# Patient Record
Sex: FEMALE | Race: WHITE | Employment: FULL TIME | URBAN - METROPOLITAN AREA
[De-identification: names, ages, dates, MRNs, and addresses within clinical notes are randomized per-mention and may not be internally consistent; named-entity substitution may affect disease eponyms.]

---

## 2022-08-30 ENCOUNTER — HOSPITAL ENCOUNTER (OUTPATIENT)
Age: 42
Setting detail: OBSERVATION
Discharge: HOME | End: 2022-08-31
Attending: PSYCHIATRY & NEUROLOGY | Admitting: PSYCHIATRY & NEUROLOGY

## 2022-08-30 PROBLEM — F32.2 MAJOR DEPRESSIVE DISORDER, SEVERE (HCC): Status: ACTIVE | Noted: 2022-08-30

## 2022-08-30 PROBLEM — F33.9 MDD (MAJOR DEPRESSIVE DISORDER), RECURRENT EPISODE (HCC): Status: ACTIVE | Noted: 2022-08-30

## 2022-08-30 PROCEDURE — 1240000000 HC EMOTIONAL WELLNESS R&B

## 2022-08-30 PROCEDURE — G0379 DIRECT REFER HOSPITAL OBSERV: HCPCS

## 2022-08-30 PROCEDURE — G0378 HOSPITAL OBSERVATION PER HR: HCPCS

## 2022-08-30 RX ORDER — DULOXETIN HYDROCHLORIDE 30 MG/1
30 CAPSULE, DELAYED RELEASE ORAL NIGHTLY
Status: ON HOLD | COMMUNITY
End: 2022-08-31 | Stop reason: HOSPADM

## 2022-08-30 RX ORDER — GABAPENTIN 300 MG/1
900 CAPSULE ORAL NIGHTLY
COMMUNITY

## 2022-08-30 RX ORDER — HYDROXYZINE 50 MG/1
50 TABLET, FILM COATED ORAL 3 TIMES DAILY PRN
Status: DISCONTINUED | OUTPATIENT
Start: 2022-08-30 | End: 2022-08-31 | Stop reason: HOSPADM

## 2022-08-30 RX ORDER — IBUPROFEN 400 MG/1
400 TABLET ORAL EVERY 6 HOURS PRN
Status: DISCONTINUED | OUTPATIENT
Start: 2022-08-30 | End: 2022-08-31 | Stop reason: HOSPADM

## 2022-08-30 RX ORDER — ACETAMINOPHEN 325 MG/1
650 TABLET ORAL EVERY 4 HOURS PRN
Status: DISCONTINUED | OUTPATIENT
Start: 2022-08-30 | End: 2022-08-31 | Stop reason: HOSPADM

## 2022-08-30 RX ORDER — TRAZODONE HYDROCHLORIDE 50 MG/1
50 TABLET ORAL NIGHTLY PRN
Status: DISCONTINUED | OUTPATIENT
Start: 2022-08-30 | End: 2022-08-31 | Stop reason: HOSPADM

## 2022-08-30 RX ORDER — OMEPRAZOLE 20 MG/1
40 CAPSULE, DELAYED RELEASE ORAL 2 TIMES DAILY
Status: ON HOLD | COMMUNITY
End: 2022-08-31 | Stop reason: HOSPADM

## 2022-08-30 RX ORDER — MAGNESIUM HYDROXIDE/ALUMINUM HYDROXICE/SIMETHICONE 120; 1200; 1200 MG/30ML; MG/30ML; MG/30ML
30 SUSPENSION ORAL EVERY 6 HOURS PRN
Status: DISCONTINUED | OUTPATIENT
Start: 2022-08-30 | End: 2022-08-31 | Stop reason: HOSPADM

## 2022-08-30 RX ORDER — OLANZAPINE 5 MG/1
5 TABLET ORAL EVERY 6 HOURS PRN
Status: DISCONTINUED | OUTPATIENT
Start: 2022-08-30 | End: 2022-08-31 | Stop reason: HOSPADM

## 2022-08-30 RX ORDER — DIPHENHYDRAMINE HYDROCHLORIDE 50 MG/ML
50 INJECTION INTRAMUSCULAR; INTRAVENOUS EVERY 4 HOURS PRN
Status: DISCONTINUED | OUTPATIENT
Start: 2022-08-30 | End: 2022-08-31 | Stop reason: HOSPADM

## 2022-08-30 RX ORDER — POLYETHYLENE GLYCOL 3350 17 G
2 POWDER IN PACKET (EA) ORAL
Status: DISCONTINUED | OUTPATIENT
Start: 2022-08-30 | End: 2022-08-31 | Stop reason: HOSPADM

## 2022-08-30 ASSESSMENT — SLEEP AND FATIGUE QUESTIONNAIRES
DO YOU USE A SLEEP AID: NO
DO YOU HAVE DIFFICULTY SLEEPING: NO
AVERAGE NUMBER OF SLEEP HOURS: 6

## 2022-08-30 ASSESSMENT — LIFESTYLE VARIABLES
HOW MANY STANDARD DRINKS CONTAINING ALCOHOL DO YOU HAVE ON A TYPICAL DAY: 1 OR 2
HOW OFTEN DO YOU HAVE A DRINK CONTAINING ALCOHOL: MONTHLY OR LESS

## 2022-08-30 NOTE — BH NOTE
Pt admitted to the unit via stretcher, accompanied by ambulance personnel. Oriented to room and VS taken.

## 2022-08-31 VITALS
HEIGHT: 64 IN | SYSTOLIC BLOOD PRESSURE: 94 MMHG | TEMPERATURE: 97.9 F | DIASTOLIC BLOOD PRESSURE: 59 MMHG | RESPIRATION RATE: 16 BRPM | WEIGHT: 148 LBS | BODY MASS INDEX: 25.27 KG/M2 | OXYGEN SATURATION: 99 % | HEART RATE: 97 BPM

## 2022-08-31 PROBLEM — F43.10 PTSD (POST-TRAUMATIC STRESS DISORDER): Status: ACTIVE | Noted: 2022-08-31

## 2022-08-31 PROBLEM — F33.9 MAJOR DEPRESSION, RECURRENT (HCC): Status: ACTIVE | Noted: 2022-08-31

## 2022-08-31 PROBLEM — T50.902A INTENTIONAL DRUG OVERDOSE (HCC): Status: ACTIVE | Noted: 2022-08-31

## 2022-08-31 LAB
CHOLESTEROL, TOTAL: 218 MG/DL (ref 0–199)
HDLC SERPL-MCNC: 55 MG/DL (ref 40–60)
LDL CHOLESTEROL CALCULATED: 129 MG/DL
TRIGL SERPL-MCNC: 171 MG/DL (ref 0–150)
TSH SERPL DL<=0.05 MIU/L-ACNC: 1.27 UIU/ML (ref 0.27–4.2)
VLDLC SERPL CALC-MCNC: 34 MG/DL

## 2022-08-31 PROCEDURE — 6370000000 HC RX 637 (ALT 250 FOR IP): Performed by: PSYCHIATRY & NEUROLOGY

## 2022-08-31 PROCEDURE — 5130000000 HC BRIDGE APPOINTMENT

## 2022-08-31 PROCEDURE — 80061 LIPID PANEL: CPT

## 2022-08-31 PROCEDURE — 84443 ASSAY THYROID STIM HORMONE: CPT

## 2022-08-31 PROCEDURE — G0378 HOSPITAL OBSERVATION PER HR: HCPCS

## 2022-08-31 PROCEDURE — 36415 COLL VENOUS BLD VENIPUNCTURE: CPT

## 2022-08-31 PROCEDURE — 83036 HEMOGLOBIN GLYCOSYLATED A1C: CPT

## 2022-08-31 PROCEDURE — 6370000000 HC RX 637 (ALT 250 FOR IP)

## 2022-08-31 RX ORDER — OMEPRAZOLE 20 MG/1
40 CAPSULE, DELAYED RELEASE ORAL
Status: DISCONTINUED | OUTPATIENT
Start: 2022-08-31 | End: 2022-08-31 | Stop reason: HOSPADM

## 2022-08-31 RX ORDER — DULOXETIN HYDROCHLORIDE 60 MG/1
60 CAPSULE, DELAYED RELEASE ORAL DAILY
Status: DISCONTINUED | OUTPATIENT
Start: 2022-08-31 | End: 2022-08-31 | Stop reason: HOSPADM

## 2022-08-31 RX ORDER — OMEPRAZOLE 40 MG/1
40 CAPSULE, DELAYED RELEASE ORAL
Qty: 60 CAPSULE | Refills: 0 | Status: SHIPPED | OUTPATIENT
Start: 2022-08-31

## 2022-08-31 RX ORDER — DULOXETIN HYDROCHLORIDE 60 MG/1
60 CAPSULE, DELAYED RELEASE ORAL DAILY
Qty: 30 CAPSULE | Refills: 0 | Status: SHIPPED | OUTPATIENT
Start: 2022-08-31

## 2022-08-31 RX ADMIN — DULOXETINE HYDROCHLORIDE 60 MG: 60 CAPSULE, DELAYED RELEASE ORAL at 12:53

## 2022-08-31 RX ADMIN — NICOTINE POLACRILEX 2 MG: 2 LOZENGE ORAL at 11:50

## 2022-08-31 ASSESSMENT — SLEEP AND FATIGUE QUESTIONNAIRES
AVERAGE NUMBER OF SLEEP HOURS: 6
DO YOU USE A SLEEP AID: NO

## 2022-08-31 NOTE — PROGRESS NOTES
Behavioral Services  Medicare Certification Upon Admission    I certify that this patient's inpatient psychiatric hospital admission is medically necessary for:    [x] (1) Treatment which could reasonably be expected to improve this patient's condition,       [x] (2) Or for diagnostic study;     AND     [x](2) The inpatient psychiatric services are provided while the individual is under the care of a physician and are included in the individualized plan of care.     Estimated length of stay/service 2d     Plan for post-hospital care outpt    Electronically signed by Skylar Fisher MD on 8/31/2022 at 12:04 PM

## 2022-08-31 NOTE — DISCHARGE INSTRUCTIONS
Advanced Directives:  Patient does not have a surrogate decision maker appointed   Name (if yes): declined Phone Number:   Patient does not have a psychiatric and/ or medical advanced directive or power of . Patient was offered psychiatric and/ or medical advanced directive or power of  information/completion but declined to complete   Why not? declined    Discharge Planning is Complete. Discharge Date: 8/31/2022  Reason for Hospitalization: Suicidal Ideation  Discharge Diagnosis: Depression  Discharging to: Private Domicile    Your instructions: Your physician here was Meek Gabriel MD. If you have any questions please call the unit at [phone] for the adult unit or [phone] for [facility]. Please note that we have a patient family advisory [place] that meets the second Wednesday of January and the second Wednesday of July at [facility] in [place] at [facility]. Department leadership would love for you to attend to give feedback on what we are doing well and areas in which we can improve our patient care. Please come if you are able and feel free to reach out to [facility] at [phone] with any questions. The National Suicide and Crisis Hotline Number is 65. You can call, chat, or text this number at any time to access emergency mental health services. Please follow up with your PCP regarding any pending labs. [facility] for individual therapy services. They offer this service through telehealth with offices based in [place] and [place]. To connect with this agency call them at [phone]. Agency:  [facility]  Address:  Offices located in [place] and [address] [place] [address]. CALL TO CHECK AVAILABILITY WITH YOUR INSURANCE NETWORK.   [facility]  Counselor in [facility]  Address: [address] [place] [address] [facility] [address]  Phone: [phone] [address]  Address: [address] [place] [address], [address] [place] [address] Drive  Phone: [phone]    Discharge Completed By: Octavio Goodpasture LSW  Fax to: Follow up provider name and number  NO FAX    The following personal items were collected during your admission and were returned to you:    Belongings  Dental Appliances: None  Vision - Corrective Lenses: None  Hearing Aid: None  Clothing: Dress  Jewelry: Body Piercing (Belly button , nipple and nose piercings.)  Body Piercings Removed: No  Electronic Devices: Cell Phone  Weapons (Notify Protective Services/Security): None  Other Valuables: Cigarettes  Home Medications: Locked  Valuables Given To: Security (Safe)  Provide Name(s) of Who Valuable(s) Were Given To: Cecilio Cai    By signing below, I understand and acknowledge receipt of the instructions indicated above.

## 2022-08-31 NOTE — GROUP NOTE
Group Therapy Note    Date: 8/31/2022    Group Start Time: 1100  Group End Time: 1200  Group Topic: Psychoeducation    [facility]        Group Therapy Note    Topic: Mindfulness & Grounding Techniques and Practice    Group facilitator led education discussion of current grounding practices of group members. Facilitator provided education in 3 categories: physical, mental, and soothing grounding techniques, assisted patients in practice of these separate categories of grounding techniques. Attendees: 9       Notes: This pt was present and actively engaged during group process, practicing each of these skills while collaborating with peers in process. Pt verbalized understanding of information presented and skills/techniques practiced. Status After Intervention:  Improved    Participation Level:  Active Listener and Interactive    Participation Quality: Sharing and Supportive      Speech:  normal      Thought Process/Content: Logical      Affective Functioning: Congruent      Mood: euthymic      Level of consciousness:  Alert and Oriented x4      Response to Learning: Progressing to goal      Endings: None Reported    Modes of Intervention: Education, Support, Socialization, Exploration, Problem-solving, and Activity      Discipline Responsible: Psychoeducational Specialist      Signature:  Rosita Davis MM, EDIE

## 2022-08-31 NOTE — PLAN OF CARE
[facility]  Admission Note     Admission Type:   Admission Type: Involuntary    Reason for admission:  Reason for Admission: Suicide attempt OD on Cymbalta and Gabapentin      Addictive Behavior:   Addictive Behavior  In the Past 3 Months, Have You Felt or Has Someone Told You That You Have a Problem With  : None    Medical Problems:   History reviewed. No pertinent past medical history. Status EXAM:  Mental Status and Behavioral Exam  Normal: No  Level of Assistance: Independent/Self  Facial Expression: Sad  Affect: Appropriate  Level of Consciousness: Alert  Frequency of Checks: 4 times per hour, close  Mood:Normal: No  Mood: Sad  Motor Activity:Normal: Yes  Eye Contact: Good  Observed Behavior: Friendly, Cooperative  Sexual Misconduct History: Current - no  Preception: [place] to person, [place] to place, [place] to time, [place] to situation  Attention:Normal: Yes  Thought Processes: Other (comment) (Linear)  Thought Content:Normal: Yes  Depression Symptoms: No problems reported or observed. Anxiety Symptoms: No problems reported or observed. Ronit Symptoms: No problems reported or observed.   Hallucinations: None  Delusions: No  Memory:Normal: Yes  Insight and Judgment: No  Insight and Judgment: Poor insight    Tobacco Screening:  Practical Counseling, on admission, yola X, if applicable and completed (first 3 are required if patient doesn't refuse):            ( ) Recognizing danger situations (included triggers and roadblocks)                    ( ) Coping skills (new ways to manage stress,relaxation techniques, changing routine, distraction)                                                           ( ) Basic information about quitting (benefits of quitting, techniques in how to quit, available resources  ( ) Referral for counseling faxed to Kristyn                                                                                                                   ( )

## 2022-08-31 NOTE — PROGRESS NOTES
[facility]  Discharge Note    Pt discharged with followings belongings:   Dental Appliances: None  Vision - Corrective Lenses: None  Hearing Aid: None  Jewelry: Body Piercing (Belly button , nipple and nose piercings.)  Body Piercings Removed: No  Clothing: Dress  Other Valuables: Cigarettes   Valuables sent home to patient. Patient education on aftercare instructions: yes  Information faxed to NA by NA  at 3:29 PM .Patient verbalize understanding of AVS:  yes. Status EXAM upon discharge:  Mental Status and Behavioral Exam  Normal: No  Level of Assistance: Independent/Self  Facial Expression: Sad  Affect: Appropriate  Level of Consciousness: Alert  Frequency of Checks: 4 times per hour, close  Mood:Normal: No  Mood: Sad  Motor Activity:Normal: Yes  Eye Contact: Good  Observed Behavior: Cooperative, Friendly  Sexual Misconduct History: Past - no  Preception: [place] to person, [place] to time, [place] to place, [place] to situation  Attention:Normal: Yes  Thought Processes:  (linear)  Thought Content:Normal: Yes  Depression Symptoms: No problems reported or observed. Anxiety Symptoms: No problems reported or observed. Ronit Symptoms: No problems reported or observed.   Hallucinations: None  Delusions: No  Memory:Normal: Yes  Insight and Judgment: No  Insight and Judgment: Poor judgment, Poor insight    Tobacco Screening:  Practical Counseling, on admission, yola X, if applicable and completed (first 3 are required if patient doesn't refuse):            (X) Recognizing danger situations (included triggers and roadblocks)                    (X) Coping skills (new ways to manage stress,relaxation techniques, changing routine, distraction)                                                           (X) Basic information about quitting (benefits of quitting, techniques in how to quit, available resources  ( ) Referral for counseling faxed to Kristyn ( ) Patient refused counseling  ( ) Patient refused referral  ( ) Patient refused prescription upon discharge  ( ) Patient has not smoked in the last 30 days    Metabolic Screening:    No results found for: LABA1C    No results found for: CHOL  No results found for: TRIG  No results found for: HDL  No components found for: LDLCAL  No results found for: LABVLDL    Michaell Claude Renee Porta), RN    Patient's significant other arrived on unit to  patient. She is discharging home with him. Left the unit at 1513.
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
## 2022-09-01 LAB
ESTIMATED AVERAGE GLUCOSE: 96.8 MG/DL
HBA1C MFR BLD: 5 %

## 2022-09-02 NOTE — DISCHARGE SUMMARY
Discharge Summary   Admit Date: 8/30/2022   Discharge Date:  8/31/2022    Condition at dc stable  Spent over 40 minutes with patient and staff on [facility] with more than 50 % of time spent with patient discussing care  Final Dx: axis I:   MDD (major depressive disorder), recurrent episode ([address]     Axis 2: No diagnosis  [place] 3: See Medical History    And Present on Admission:   MDD (major depressive disorder), recurrent episode ([address]   Intentional drug overdose ([address]   PTSD (post-traumatic stress disorder)     Axis 4: Problems related to the social environment  Axis 5:  On Admission: 41-50 serious symptoms At Discharge: 61-70 mild symptoms   All conditions on Axis 1 and Axis 2 and active problems on Axis 3 were treated while patient was hospitalized. [facility] Problems    Diagnosis Date Noted    Intentional drug overdose ([address] Keegan Ibarra 08/31/2022     Priority: Medium    PTSD (post-traumatic stress disorder) [F43.10] 08/31/2022     Priority: Medium    MDD (major depressive disorder), recurrent episode ([address] [F33.9] 08/30/2022     Priority: Medium   )   Condition on DC  Mood and affect are stable and pt is not suicidal   VITALS:  BP (!) 94/59   Pulse 97   Temp 97.9 °F (36.6 °C) (Temporal)   Resp 16   Ht 5' 4\" (1.626 m)   Wt 148 lb (67.1 kg)   LMP 08/07/2022 (Approximate)   SpO2 99%   BMI 25.40 kg/m²   Brief Summary Present Illness   CHIEF COMPLAINT:  Overdose. HISTORY OF PRESENT ILLNESS:  The patient is a 69-year-old female who  presented initially to [address] [place] [address],  after she had been struggling with variety of aspects of her life. She  stated that she has had a rough weekend and she is the POA for her  grandmother who lives in [place]. She and her sister are about POA,  but her other sister lives out of the area. The patient moved to  [address] [place] [address] in 49/3394 from hospitals, which is where her  grandmother lives.   She has been in an assisted living situation there. 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 She feels that the job is  very high stress and she has been there for 3 months. She stated that  she also has two teenage stepchildren and she states she took off on  Friday, Monday last week and felt that she was not going to be disturbed  and work continue to call her regarding issues and she felt that there  was a \"breaking point. \"  She felt pulled and was very agitated about  that. She stated that she quickly wrote a note that she cannot be  everything to every one and decided at home to take approximately 30  Cymbalta and 40 gabapentin and did not initially tell her boyfriend, but  10-15 minutes later she did, and she was brought to the hospital for  treatment. Her boyfriend is a mental health provider and she felt like  he was helpful and supportive and trying to get her some help. She stated that her anxiety has been very high and she has been  frequently more depressed. 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 There is some type of a cage that was surgically  placed and she continues to feel agitated throughout the day and feels  that she cannot be still. Today, she states she is not suicidal.  She stated that she was \"stupid\"  for attempting. She denies any prior suicide attempts  Hospital Course  Patient stabilized on unit. restart Cymbalta, but increase from 30-60 mg daily   Patient was discharged to home to continue recovery in the community. Discharge home with outpatient services in the [address] [place] [address]. The patient is currently endorsing no suicidal ideation and stated  that she feels safe returning home and will return to her work and her  home with her boyfriend. Discuss if symptoms return that she should  seek help immediately. Discharge today.   PE: (reviewed) and labs (see medical H&PE)  Labs:    Admission on 08/30/2022, Discharged on 08/31/2022   Component Date Value Ref Range Status    Cholesterol, Total 08/31/2022 218 (A) 0 - 199 mg/dL Final    Triglycerides 08/31/2022 171 (A) 0 - 150 mg/dL Final    HDL 08/31/2022 55  40 - 60 mg/dL Final    LDL Calculated 08/31/2022 129 (A) <100 mg/dL Final    VLDL Cholesterol Calculated 08/31/2022 34  Not Established mg/dL Final    Hemoglobin A1C 08/31/2022 5.0  See comment % Final    Comment: Comment:  Diagnosis of Diabetes: > or = 6.5%  Increased risk of diabetes (Prediabetes): 5.7-6.4%  Glycemic Control: Nonpregnant Adults: <7.0%                    Pregnant: <6.0%        eAG 08/31/2022 96.8  mg/dL Final    TSH 08/31/2022 1.27  0.27 - 4.20 uIU/mL Final        Mental Status Exam at Discharge:  Level of consciousness:  awake  Appearance:  well-appearing, in chair, good grooming and good hygiene well-developed, well-nourished  Behavior/Motor:  no abnormalities noted normal gait and station AIMS: 0  Attitude toward examiner:  cooperative, attentive and good eye contact  Speech:  spontaneous, normal rate, normal volume and well articulated  Mood:  dysthymic  Affect:  mood congruent Anxiety: mild  Hallucinations: Absent  Thought processes:  coherent Attention span, Concentration & Attention:  attention span and concentration were age appropriate  Thought content:   no evidence of delusions OCD: none    Insight: normal insight and judgment Cognition:  oriented to person, place, and time  Fund of Knowledge: average  IQ:average Memory: intact  Suicide:  No specific plan to harm self  Sleep: sleeps through the night  Appetite: ok   Reassess Danii Risk:  no specific plan to harm self Pt has phone numbers to contact if suicidal thoughts recur and states pt will return to the hospital if suicidal feelings return.    Hospital Routine Meds:     Hospital PRN Meds:    Discharge Meds:    Discharge Medication List as of 8/31/2022  3:03 PM Details   DULoxetine (CYMBALTA) 60 MG extended release capsule Take 1 capsule by mouth daily, Disp-30 capsule, R-0Normal      omeprazole (PRILOSEC) 40 MG delayed release capsule Take 1 capsule by mouth 2 times daily (before meals), Disp-60 capsule, R-0Normal                Details   gabapentin (NEURONTIN) 300 MG capsule Take 900 mg by mouth nightly. Historical Med             Disposition - Residence Home     Follow Up:  See Discharge Instructions